# Patient Record
Sex: MALE | HISPANIC OR LATINO | Employment: FULL TIME | URBAN - NONMETROPOLITAN AREA
[De-identification: names, ages, dates, MRNs, and addresses within clinical notes are randomized per-mention and may not be internally consistent; named-entity substitution may affect disease eponyms.]

---

## 2022-02-10 ENCOUNTER — NON-PROVIDER VISIT (OUTPATIENT)
Dept: URGENT CARE | Facility: PHYSICIAN GROUP | Age: 57
End: 2022-02-10

## 2022-02-10 DIAGNOSIS — Z02.1 PRE-EMPLOYMENT DRUG SCREENING: ICD-10-CM

## 2022-02-10 LAB
AMP AMPHETAMINE: NORMAL
COC COCAINE: NORMAL
INT CON NEG: NORMAL
INT CON POS: NORMAL
MET METHAMPHETAMINES: NORMAL
OPI OPIATES: NORMAL
PCP PHENCYCLIDINE: NORMAL
POC DRUG COMMENT 753798-OCCUPATIONAL HEALTH: NEGATIVE
THC: NORMAL

## 2022-02-10 PROCEDURE — 80305 DRUG TEST PRSMV DIR OPT OBS: CPT | Performed by: FAMILY MEDICINE

## 2022-11-07 ENCOUNTER — OCCUPATIONAL MEDICINE (OUTPATIENT)
Dept: URGENT CARE | Facility: PHYSICIAN GROUP | Age: 57
End: 2022-11-07
Payer: OTHER MISCELLANEOUS

## 2022-11-07 ENCOUNTER — APPOINTMENT (OUTPATIENT)
Dept: RADIOLOGY | Facility: IMAGING CENTER | Age: 57
End: 2022-11-07
Attending: STUDENT IN AN ORGANIZED HEALTH CARE EDUCATION/TRAINING PROGRAM
Payer: OTHER MISCELLANEOUS

## 2022-11-07 ENCOUNTER — NON-PROVIDER VISIT (OUTPATIENT)
Dept: URGENT CARE | Facility: PHYSICIAN GROUP | Age: 57
End: 2022-11-07
Payer: OTHER MISCELLANEOUS

## 2022-11-07 VITALS
DIASTOLIC BLOOD PRESSURE: 74 MMHG | HEIGHT: 66 IN | WEIGHT: 196 LBS | TEMPERATURE: 97.3 F | BODY MASS INDEX: 31.5 KG/M2 | RESPIRATION RATE: 16 BRPM | OXYGEN SATURATION: 98 % | HEART RATE: 60 BPM | SYSTOLIC BLOOD PRESSURE: 136 MMHG

## 2022-11-07 DIAGNOSIS — S80.12XA CONTUSION OF LEFT LOWER EXTREMITY, INITIAL ENCOUNTER: ICD-10-CM
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
## 2022-11-07 DIAGNOSIS — Z02.1 PRE-EMPLOYMENT DRUG SCREENING: ICD-10-CM
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
## 2022-11-07 LAB
AMP AMPHETAMINE: NORMAL
BREATH ALCOHOL COMMENT: NORMAL
COC COCAINE: NORMAL
INT CON NEG: NORMAL
INT CON POS: NORMAL
MET METHAMPHETAMINES: NORMAL
OPI OPIATES: NORMAL
PCP PHENCYCLIDINE: NORMAL
POC BREATHALIZER: 0 PERCENT (ref 0–0.01)
POC DRUG COMMENT 753798-OCCUPATIONAL HEALTH: NEGATIVE
THC: NORMAL

## 2022-11-07 PROCEDURE — 99203 OFFICE O/P NEW LOW 30 MIN: CPT | Performed by: STUDENT IN AN ORGANIZED HEALTH CARE EDUCATION/TRAINING PROGRAM

## 2022-11-07 PROCEDURE — 80305 DRUG TEST PRSMV DIR OPT OBS: CPT | Performed by: STUDENT IN AN ORGANIZED HEALTH CARE EDUCATION/TRAINING PROGRAM

## 2022-11-07 PROCEDURE — 82075 ASSAY OF BREATH ETHANOL: CPT | Performed by: STUDENT IN AN ORGANIZED HEALTH CARE EDUCATION/TRAINING PROGRAM

## 2022-11-07 PROCEDURE — 73590 X-RAY EXAM OF LOWER LEG: CPT | Mod: TC,FY,LT | Performed by: RADIOLOGY

## 2022-11-07 NOTE — LETTER
[facility] [address] [place] [address]  Phone:  [phone] - Fax:  [phone]   [facility] Progress Report and Disability Certification  Date of Service: 11/7/2022   No Show:  No  Date / Time of Next Visit: 11/14/2022   Claim Information   Patient Name: Javier Cruz  Claim Number:     Employer: [employer] Date of Injury: 11/4/2022     Insurer / TPA: Misc Workers Comp ID / SSN:     Occupation:  1 Diagnosis: Diagnoses of Contusion of left lower extremity, initial encounter and Work related injury were pertinent to this visit.    Medical Information   Related to Industrial Injury? Yes   Subjective Complaints:  Patient does not speak English.  An  was used to aid with the encounter.    Today's date: 11/7/2022.  Visit #1.  Date of injury: 11/4/2022.  CC: Left shin pain  Mechanism of injury: Patient was carrying a heavy basket and unintentionally hit his left leg with 2 metal pieces on the basket suffering a skin abrasion.  He then developed swelling and pain.  Now he is having persistent discomfort at the site of impact that is aggravated with palpation and weightbearing.  He has tried rest and Tylenol which have not helped.  No additional modifying factors.  No history of additional trauma, injury or surgery to his left lower extremity.   Objective Findings: Gen: no acute distress, normal voice  Skin: dry, intact, moist mucosal membranes  Head: Atraumatic, normocephalic  Psych: normal affect, normal judgement, alert, awake  Musculoskeletal: Left lower extremity, middle 1/3 anteriorly: half dollar size skin abrasion with surrounding edema, minimal ecchymosis.  Localized tenderness to palpation within the region.      RADIOLOGY RESULTS  DX-TIBIA AND FIBULA LEFT    Result Date: 11/7/2022 11/7/2022 4:19 PM HISTORY/REASON FOR EXAM:  Pain/Deformity Following Trauma. TECHNIQUE/EXAM DESCRIPTION AND NUMBER OF VIEWS:  2  views of the LEFT tibia and fibula. COMPARISON: None FINDINGS: Anterior proximal leg soft tissue swelling without foreign body No acute fracture or subluxation is seen. No periosteal reaction or bony destruction Moderate Achilles enthesophyte formation     No radiographic evidence of acute bony injury. Anterior soft tissue swelling compatible with contusion               Pre-Existing Condition(s):     Assessment:   Initial Visit    Status: Additional Care Required  Permanent Disability:No    Plan:      Diagnostics:      Comments:       Disability Information   Status: Released to Restricted Duty    From:  11/7/2022  Through: 11/14/2022 Restrictions are: Temporary   Physical Restrictions   Sitting:    Standing:  < or = to 4 hrs/day Stooping:    Bending:      Squatting:    Walking:  < or = to 4 hrs/day Climbing:    Pushing:      Pulling:    Other:    Reaching Above Shoulder (L):   Reaching Above Shoulder (R):       Reaching Below Shoulder (L):    Reaching Below Shoulder (R):      Not to exceed Weight Limits   Carrying(hrs):   Weight Limit(lb): < or = to 25 pounds Lifting(hrs):   Weight  Limit(lb): < or = to 25 pounds   Comments: X-rays negative for any acute osseous abnormalities.  - Restrictions per D 39  - Continue Tylenol as needed for symptomatic relief  - Also encouraged ice and elevation  - Follow-up in 1 week for reevaluation    Repetitive Actions   Hands: i.e. Fine Manipulations from Grasping:     Feet: i.e. Operating Foot Controls:     Driving / Operate Machinery:     Health Care Provider’s Original or Electronic Signature  Hamlet Can D.O. Health Care Provider’s Original or Electronic Signature    Munir Stein MD         Clinic Name / Location: [address] [address] Clinic Phone Number: Dept: [phone]   Appointment Time: 12:40 Pm Visit Start Time: 3:55 PM   Check-In Time:  1:21 Pm Visit Discharge Time: 4:30 PM   Original-Treating Physician or  Chiropractor    Page 2-Insurer/TPA    Page 3-Employer    Page 4-Employee

## 2022-11-07 NOTE — LETTER
"EMPLOYEE’S CLAIM FOR COMPENSATION/ REPORT OF INITIAL TREATMENT  FORM C-4    EMPLOYEE’S CLAIM - PROVIDE ALL INFORMATION REQUESTED   First Name  Javier Last Name  Sinan Cruz Birthdate                    1965                Sex  male Claim Number (Insurer’s Use Only)    Home Address  [address] Age  57 y.o. 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 (Be specific an answer in detail. Use additional sheet if necessary)  After stacking product , he hit his leg on the product, causing a small cut and bruise   If you believe that you have an occupational disease, when did you first have knowledge of the disability and it relationship to your employment?  n/a Witnesses to the Accident  none      Nature of Injury or Occupational Disease  Workers' Compensation  Part(s) of Body Injured or Affected  Lower Leg (L), N/A, N/A    I certify that the above is true and correct to the best of my knowledge and that I " have provided this information in order to obtain the benefits of [place]  I hereby authorize any physician, chiropractor, surgeon, practitioner, or other person, any hospital, including [facility] or [facility] hospital, any medical service organization, any insurance company, or other institution or organization to release to each other, any medical or other information, including benefits paid or payable, pertinent to this injury or disease, except information relative to diagnosis, treatment and/or counseling for AIDS, psychological conditions, alcohol or controlled substances, for which I must give specific authorization.  A Photostat of this authorization shall be as valid as the original.     Date 11/07/2022   Place [facility] Employee’s Original or  *Electronic Signature   THIS REPORT MUST BE COMPLETED AND MAILED WITHIN 3 WORKING DAYS OF TREATMENT   Place  [facility]  Name of Facility  [facility]   Date  11/7/2022 Diagnosis and Description of Injury or Occupational Disease  (S80.12XA) Contusion of left lower extremity, initial encounter  (Y99.0) Work related injury Is there evidence the injured employee was under the influence of alcohol and/or another controlled substance at the time of accident?  ? No ? Yes (if yes, please explain)    Hour  3:55 PM   Diagnoses of Contusion of left lower extremity, initial encounter and Work related injury were pertinent to this visit. No   Treatment  X-rays negative for any acute osseous abnormalities.  - Restrictions per D 39  - Continue Tylenol as needed for symptomatic relief  - Also encouraged ice and elevation  - Follow-up in 1 week for reevaluation  Have you advised the patient to remain off work five days or     more?    X-Ray Findings  Negative   ? Yes Indicate dates:   From   To      From information given by the employee,  "together with medical evidence, can        you directly connect this injury or occupational disease as job incurred?  Yes ? No If no, is the injured employee capable of:  ? full duty  No ? modified duty  Yes   Is additional medical care by a physician indicated?  Yes If Modified Duty, Specify any Limitations / Restrictions  See D39   Do you know of any previous injury or disease contributing to this condition or occupational disease?  ? Yes ? No (Explain if yes)                          No   Date  11/7/2022 Print Health Care Provider's   Hamlet Can D.O. I certify the employer’s copy of  this form was mailed on:   Address  [address] Insurer’s Use Only     [facility] Zip  [address]    Provider’s Tax ID Number  [id number] Telephone  Dept: [phone]             Health Care Provider’s Original or Electronic Signature  e-HAMLET Puentes D.O. Degree (MD,DO, DC,PA-C,APRN)   DO      * Complete and attach Release of Information (Form C-4A) when injured employee signs C-4 Form electronically  ORIGINAL - TREATING HEALTHCARE PROVIDER PAGE 2 - INSURER/TPA PAGE 3 - EMPLOYER PAGE 4 - EMPLOYEE             Form C-4 (rev.08/21)           BRIEF DESCRIPTION OF RIGHTS AND BENEFITS  (Pursuant to [place] 616C.050)    Notice of Injury or Occupational Disease (Incident Report Form C-1): If an injury or occupational disease (OD) arises out of and in the course of employment, you must provide written notice to your employer as soon as practicable, but no later than 7 days after the accident or OD. Your employer shall maintain a sufficient supply of the required forms.    Claim for Compensation (Form C-4): If medical treatment is sought, the form C-4 is available at the place of initial treatment. A completed \"Claim for Compensation\" (Form C-4) must be filed within 90 days after an accident or OD. The treating physician or chiropractor must, within 3 working days after treatment, complete and mail to the " employer, the employer's insurer and third-party , the Claim for Compensation.    Medical Treatment: If you require medical treatment for your on-the-job injury or OD, you may be required to select a physician or chiropractor from a list provided by your workers’ compensation insurer, if it has contracted with an Organization for Managed Care (MCO) or Preferred Provider Organization (PPO) or providers of health care. If your employer has not entered into a contract with an MCO or PPO, you may select a physician or chiropractor from the Panel of Physicians and Chiropractors. Any medical costs related to your industrial injury or OD will be paid by your insurer.    Temporary Total Disability (TTD): If your doctor has certified that you are unable to work for a period of at least 5 consecutive days, or 5 cumulative days in a 20-day period, or places restrictions on you that your employer does not accommodate, you may be entitled to TTD compensation.    Temporary Partial Disability (TPD): If the wage you receive upon reemployment is less than the compensation for TTD to which you are entitled, the insurer may be required to pay you TPD compensation to make up the difference. TPD can only be paid for a maximum of 24 months.    Permanent Partial Disability (PPD): When your medical condition is stable and there is an indication of a PPD as a result of your injury or OD, within 30 days, your insurer must arrange for an evaluation by a rating physician or chiropractor to determine the degree of your PPD. The amount of your PPD award depends on the date of injury, the results of the PPD evaluation, your age and wage.    Permanent Total Disability (PTD): If you are medically certified by a treating physician or chiropractor as permanently and totally disabled and have been granted a PTD status by your insurer, you are entitled to receive monthly benefits not to exceed 66 2/3% of your average monthly wage. The  amount of your PTD payments is subject to reduction if you previously received a lump-sum PPD award.    Vocational Rehabilitation Services: You may be eligible for vocational rehabilitation services if you are unable to return to the job due to a permanent physical impairment or permanent restrictions as a result of your injury or occupational disease.    Transportation and Per Reagan Reimbursement: You may be eligible for travel expenses and per reagan associated with medical treatment.    Reopening: You may be able to reopen your claim if your condition worsens after claim closure.     Appeal Process: If you disagree with a written determination issued by the insurer or the insurer does not respond to your request, you may appeal to the Department of Administration, , by following the instructions contained in your determination letter. You must appeal the determination within 70 days from the date of the determination letter at [address], [address], [place], [place] [address], or [address], [address], [place] [address]. If you disagree with the  decision, you may appeal to the Department of Administration, . You must file your appeal within 30 days from the date of the  decision letter at [address], [address], [place], [place] [address], or [address], [address], [place] [address]. If you disagree with a decision of an , you may file a petition for judicial review with the District Court. You must do so within 30 days of the Appeal Officer’s decision. You may be represented by an  at your own expense or you may contact the [facility] for possible representation.    [place]  for Injured Workers (NAIW): If you disagree with a  decision, you may request that NAIW represent you without charge at an  Hearing. For information regarding denial of benefits,  you may contact the [facility] at: [address], [address], [place] [address], [phone]) [phone], or [address] [facility], [address], [place] [address], [phone]    To File a Complaint with the Division: If you wish to file a complaint with the  of the Division of Industrial Relations (DIR),  please contact the Workers’ Compensation Section, [address], [address], [place], [place] [address], telephone (218) 210-7592, or [address], [address], [place], [place] [address], telephone [phone]) [phone].    For assistance with Workers’ Compensation Issues: You may contact the [facility] Office for Consumer Health Assistance, [address] [facility] [address], [place], [place] [address], Toll Free 1-776.116.2715, Web site: http://[facility].nv.gov/Programs/JENNY E-mail: [email or web address].[email or web address].[facility]              __________________________________________________________________                                    ___11/07/2022______            Employee Name / Signature                                                                                                                            Date                                                                                                                                                                                                                              D-2 (rev. 10/20)

## 2022-11-08 NOTE — PROGRESS NOTES
"Subjective:     Javier Cruz is a 57 y.o. male who presents for Work-Related Injury (DOI: 11/04/2022, (L) side leg, work comp new.  )      Patient does not speak English.  An  was used to aid with the encounter.    Today's date: 11/7/2022.  Visit #1.  Date of injury: 11/4/2022.  CC: Left shin pain  Mechanism of injury: Patient was carrying a heavy basket and unintentionally hit his left leg with 2 metal pieces on the basket suffering a skin abrasion.  He then developed swelling and pain.  Now he is having persistent discomfort at the site of impact that is aggravated with palpation and weightbearing.  He has tried rest and Tylenol which have not helped.  No additional modifying factors.  No history of additional trauma, injury or surgery to his left lower extremity.    PMH:   No pertinent past medical history to this problem  MEDS:  Medications were reviewed in EMR  ALLERGIES:  Allergies were reviewed in EMR  FH:   No pertinent family history to this problem       Objective:     /74   Pulse 60   Temp 36.3 °C (97.3 °F) (Temporal)   Resp 16   Ht 1.676 m (5' 6\")   Wt 88.9 kg (196 lb)   SpO2 98%   BMI 31.64 kg/m²     Gen: no acute distress, normal voice  Skin: dry, intact, moist mucosal membranes  Head: Atraumatic, normocephalic  Psych: normal affect, normal judgement, alert, awake  Musculoskeletal: Left lower extremity, middle 1/3 anteriorly: half dollar size skin abrasion with surrounding edema, minimal ecchymosis.  Localized tenderness to palpation within the region.      RADIOLOGY RESULTS  DX-TIBIA AND FIBULA LEFT    Result Date: 11/7/2022 11/7/2022 4:19 PM HISTORY/REASON FOR EXAM:  Pain/Deformity Following Trauma. TECHNIQUE/EXAM DESCRIPTION AND NUMBER OF VIEWS:  2 views of the LEFT tibia and fibula. COMPARISON: None FINDINGS: Anterior proximal leg soft tissue swelling without foreign body No acute fracture or subluxation is seen. No periosteal reaction or bony destruction Moderate " Achilles enthesophyte formation     No radiographic evidence of acute bony injury. Anterior soft tissue swelling compatible with contusion                Assessment/Plan:       1. Contusion of left lower extremity, initial encounter  - DX-TIBIA AND FIBULA LEFT; Future    2. Work related injury    Released to Restricted Duty FROM 11/7/2022 TO 11/14/2022  X-rays negative for any acute osseous abnormalities.  - Restrictions per D 39  - Continue Tylenol as needed for symptomatic relief  - Also encouraged ice and elevation  - Follow-up in 1 week for reevaluation       Differential diagnosis, natural history, supportive care, and indications for immediate follow-up discussed.

## 2022-11-14 ENCOUNTER — OCCUPATIONAL MEDICINE (OUTPATIENT)
Dept: URGENT CARE | Facility: PHYSICIAN GROUP | Age: 57
End: 2022-11-14
Payer: OTHER MISCELLANEOUS

## 2022-11-14 VITALS
BODY MASS INDEX: 32.99 KG/M2 | OXYGEN SATURATION: 97 % | RESPIRATION RATE: 16 BRPM | DIASTOLIC BLOOD PRESSURE: 60 MMHG | WEIGHT: 198 LBS | HEIGHT: 65 IN | SYSTOLIC BLOOD PRESSURE: 108 MMHG | TEMPERATURE: 98.6 F | HEART RATE: 65 BPM

## 2022-11-14 DIAGNOSIS — S80.12XD CONTUSION OF LEFT LOWER EXTREMITY, SUBSEQUENT ENCOUNTER: ICD-10-CM
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
## 2022-11-14 PROCEDURE — 99213 OFFICE O/P EST LOW 20 MIN: CPT | Performed by: FAMILY MEDICINE

## 2022-11-14 NOTE — LETTER
[facility] [place] [place] [address]  Phone:  [phone] - Fax:  [phone]   [facility] Progress Report and Disability Certification  Date of Service: 11/14/2022   No Show:  No  Date / Time of Next Visit: 11/21/2022   Claim Information   Patient Name: Javier Cruz  Claim Number:     Employer: [employer]  Date of Injury: 11/4/2022     Insurer / TPA: Misc Workers Comp  ID / SSN:     Occupation:  1  Diagnosis: Diagnoses of Work related injury and Contusion of left lower extremity, subsequent encounter were pertinent to this visit.    Medical Information   Related to Industrial Injury? Yes    Subjective Complaints:  DOI: 11/4/2022  ANITHA: Patient was carrying a heavy basket and unintentionally hit his left leg with 2 metal pieces on the basket.  He then had left shin pain.  Denies prior left leg injury.  No secondary employment.    Visit #2 today: He is feeling better today, estimates he is 100% of his baseline. He has been back to work and is doing well. He is no longer needing any Tylenol or Ibuprofen.    Objective Findings: Superficial abrasion noted to the anterior, middle part of his left shin, this is scabbed over and healing appropriately.  Mild surrounding edema, no erythema or signs of infection.  Not tender to palpation.   Pre-Existing Condition(s):     Assessment:   Condition Improved    Status: Additional Care Required  Permanent Disability:No    Plan:      Diagnostics:      Comments:  -We will release patient to full duty.  Plan for MMI if all goes well for next visit  -Ice, heat, Tylenol, and ibuprofen as needed for symptomatic relief    Disability Information   Status: Released to Full Duty    From:  11/14/2022  Through: 11/21/2022 Restrictions are: Temporary   Physical Restrictions   Sitting:    Standing:    Stooping:    Bending:      Squatting:    Walking:    Climbing:    Pushing:      Pulling:    Other:     Reaching Above Shoulder (L):   Reaching Above Shoulder (R):       Reaching Below Shoulder (L):    Reaching Below Shoulder (R):      Not to exceed Weight Limits   Carrying(hrs):   Weight Limit(lb):   Lifting(hrs):   Weight  Limit(lb):     Comments:      Repetitive Actions   Hands: i.e. Fine Manipulations from Grasping:     Feet: i.e. Operating Foot Controls:     Driving / Operate Machinery:     Health Care Provider’s Original or Electronic Signature  Olivia Marc M.D. Health Care Provider’s Original or Electronic Signature    Facundo Novoa DO MPH     Clinic Name / Location: [address] [address] Clinic Phone Number: Dept: [phone]   Appointment Time: 9:30 Am Visit Start Time: 9:58 AM   Check-In Time:  9:23 Am Visit Discharge Time: 10:00 AM   Original-Treating Physician or Chiropractor    Page 2-Insurer/TPA    Page 3-Employer    Page 4-Employee

## 2022-11-14 NOTE — PROGRESS NOTES
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 Feeling much better. Would like to be released back to full duty. / )      DOI: 11/4/2022  ANITHA: Patient was carrying a heavy basket and unintentionally hit his left leg with 2 metal pieces on the basket.  He then had left shin pain.  Denies prior left leg injury.  No secondary employment.    Visit #2 today: He is feeling better today, estimates he is 100% of his baseline. He has been back to work and is doing well. He is no longer needing any Tylenol or Ibuprofen.     PMH:   No pertinent past medical history to this problem  MEDS:  Medications were reviewed in EMR  ALLERGIES:  Allergies were reviewed in EMR  SOCHX:  Works as a weldor  FH:   No pertinent family history to this problem     Objective:     /60   Pulse 65   Temp 37 °C (98.6 °F) (Temporal)   Resp 16   Ht 1.651 m (5' 5\")   Wt 89.8 kg (198 lb)   SpO2 97%   BMI 32.95 kg/m²     Superficial abrasion noted to the anterior, middle part of his left shin, this is scabbed over and healing appropriately.  Mild surrounding edema, no erythema or signs of infection.  Not tender to palpation.    Assessment/Plan:     1. Work related injury    2. Contusion of left lower extremity, subsequent encounter  Released to Full Duty FROM 11/14/2022 TO 11/21/2022     -We will release patient to full duty.  Plan for MMI if all goes well for next visit  -Ice, heat, Tylenol, and ibuprofen as needed for symptomatic relief    Differential diagnosis, natural history, supportive care, and indications for immediate follow-up discussed.  "

## 2022-11-21 ENCOUNTER — OCCUPATIONAL MEDICINE (OUTPATIENT)
Dept: URGENT CARE | Facility: PHYSICIAN GROUP | Age: 57
End: 2022-11-21
Payer: OTHER MISCELLANEOUS

## 2022-11-21 VITALS
DIASTOLIC BLOOD PRESSURE: 74 MMHG | HEART RATE: 98 BPM | BODY MASS INDEX: 32.99 KG/M2 | SYSTOLIC BLOOD PRESSURE: 116 MMHG | WEIGHT: 198 LBS | TEMPERATURE: 96.8 F | RESPIRATION RATE: 16 BRPM | HEIGHT: 65 IN | OXYGEN SATURATION: 100 %

## 2022-11-21 DIAGNOSIS — S80.12XD CONTUSION OF LEFT LOWER EXTREMITY, SUBSEQUENT ENCOUNTER: ICD-10-CM

## 2022-11-21 PROCEDURE — 99213 OFFICE O/P EST LOW 20 MIN: CPT | Performed by: FAMILY MEDICINE

## 2022-11-21 NOTE — LETTER
[address] [place] [address]  Phone:  [phone] - Fax:  [phone]   [facility] Progress Report and Disability Certification  Date of Service: 11/21/2022   No Show:  No  Date / Time of Next Visit:     Claim Information   Patient Name: Javier Cruz  Claim Number:     Employer: [employer]  Date of Injury: 11/4/2022     Insurer / TPA: Misc Workers Comp  ID / SSN:     Occupation:  1  Diagnosis: The encounter diagnosis was Contusion of left lower extremity, subsequent encounter.    Medical Information   Related to Industrial Injury? Yes    Subjective Complaints:    DOI:   11/4      Status post left leg contusion.       States pain is resolved.      Objective Findings: Musculoskeletal -  Left  leg - no TTP.  No bruising or swelling.  No lower extremity edema noted.  Benita's sign negative       Pre-Existing Condition(s):     Assessment:   Condition Improved    Status: Discharged /  MMI  Permanent Disability:No    Plan:      Diagnostics:      Comments:       Disability Information   Status: Released to Full Duty    From:     Through:   Restrictions are:     Physical Restrictions   Sitting:    Standing:    Stooping:    Bending:      Squatting:    Walking:    Climbing:    Pushing:      Pulling:    Other:    Reaching Above Shoulder (L):   Reaching Above Shoulder (R):       Reaching Below Shoulder (L):    Reaching Below Shoulder (R):      Not to exceed Weight Limits   Carrying(hrs):   Weight Limit(lb):   Lifting(hrs):   Weight  Limit(lb):     Comments: Contusion of left lower extremity, subsequent encounter   Resolved  MMI  Full duty    Repetitive Actions   Hands: i.e. Fine Manipulations from Grasping:     Feet: i.e. Operating Foot Controls:     Driving / Operate Machinery:     Health Care Provider’s Original or Electronic Signature  Vito Donahue M.D. Health Care Provider’s Original or Electronic Signature    Facundo  DO Sommer MPH     Clinic Name / Location: [facility] [place]  [place] [address] Clinic Phone Number: Dept: [phone]   Appointment Time: 10:30 Am Visit Start Time: 10:56 AM   Check-In Time:  10:11 Am Visit Discharge Time: 11:09 AM   Original-Treating Physician or Chiropractor    Page 2-Insurer/TPA    Page 3-Employer    Page 4-Employee

## 2022-11-21 NOTE — PROGRESS NOTES
"  HPI:       DOI:   11/4      Status post left leg contusion.       States pain is resolved.              No past medical history on file.    Social History     Tobacco Use    Smoking status: Never   Substance Use Topics    Alcohol use: No    Drug use: No         No family history on file.            Review of Systems   Constitutional: Negative for fever, chills and malaise/fatigue.   Eyes: Negative for vision changes, d/c.    Respiratory: Negative for cough and sputum production.    Cardiovascular: Negative for chest pain and palpitations.   Gastrointestinal: Negative for nausea, vomiting, abdominal pain, diarrhea and constipation.   Genitourinary: Negative for dysuria, urgency and frequency.   Skin: Negative for rash or  itching.   Neurological: Negative for dizziness and tingling.   Psychiatric/Behavioral: Negative for depression.   Hematologic/lymphatic - denies bruising or excessive bleeding  All other systems reviewed and are negative.      OBJECTIVE  /74   Pulse 98   Temp 36 °C (96.8 °F) (Temporal)   Resp 16   Ht 1.651 m (5' 5\")   Wt 89.8 kg (198 lb)   SpO2 100%     HEENT - PERRLA, EOMI  Neuro - alert and oriented x3. CN 2-12 grossly intact.  Lungs - CTA. No wheezes, rhonchi or rales.  Heart - regular rate and rhythm without murmur.  Abdomen - soft and non-tender, bowel sounds active x4.  Musculoskeletal -  left leg - no TTP.  No bruising or swelling.  + healing scab noted on anterior shin.  No lower extremity edema noted.  Benita's sign negative          A/P:     1. Contusion of left lower extremity, subsequent encounter   Resolved  MMI  Full duty    "

## 2022-12-19 ENCOUNTER — NON-PROVIDER VISIT (OUTPATIENT)
Dept: URGENT CARE | Facility: PHYSICIAN GROUP | Age: 57
End: 2022-12-19

## 2022-12-19 ENCOUNTER — HOSPITAL ENCOUNTER (OUTPATIENT)
Facility: MEDICAL CENTER | Age: 57
End: 2022-12-19
Attending: PHYSICIAN ASSISTANT

## 2022-12-19 ENCOUNTER — OCCUPATIONAL MEDICINE (OUTPATIENT)
Dept: URGENT CARE | Facility: PHYSICIAN GROUP | Age: 57
End: 2022-12-19
Payer: OTHER MISCELLANEOUS

## 2022-12-19 ENCOUNTER — APPOINTMENT (OUTPATIENT)
Dept: RADIOLOGY | Facility: IMAGING CENTER | Age: 57
End: 2022-12-19
Attending: PHYSICIAN ASSISTANT
Payer: OTHER MISCELLANEOUS

## 2022-12-19 VITALS
BODY MASS INDEX: 32.65 KG/M2 | HEIGHT: 65 IN | OXYGEN SATURATION: 95 % | TEMPERATURE: 97.7 F | RESPIRATION RATE: 16 BRPM | SYSTOLIC BLOOD PRESSURE: 126 MMHG | DIASTOLIC BLOOD PRESSURE: 94 MMHG | WEIGHT: 196 LBS | HEART RATE: 65 BPM

## 2022-12-19 DIAGNOSIS — L03.012 CELLULITIS OF THUMB, LEFT: ICD-10-CM

## 2022-12-19 DIAGNOSIS — S61.032A PUNCTURE WOUND OF LEFT THUMB, INITIAL ENCOUNTER: ICD-10-CM

## 2022-12-19 DIAGNOSIS — Z02.83 ENCOUNTER FOR DRUG SCREENING: ICD-10-CM

## 2022-12-19 PROCEDURE — 87070 CULTURE OTHR SPECIMN AEROBIC: CPT

## 2022-12-19 PROCEDURE — 99204 OFFICE O/P NEW MOD 45 MIN: CPT | Mod: 25 | Performed by: PHYSICIAN ASSISTANT

## 2022-12-19 PROCEDURE — 90471 IMMUNIZATION ADMIN: CPT | Performed by: PHYSICIAN ASSISTANT

## 2022-12-19 PROCEDURE — 87205 SMEAR GRAM STAIN: CPT

## 2022-12-19 PROCEDURE — 73140 X-RAY EXAM OF FINGER(S): CPT | Mod: TC,FY,LT | Performed by: RADIOLOGY

## 2022-12-19 PROCEDURE — 90715 TDAP VACCINE 7 YRS/> IM: CPT | Performed by: PHYSICIAN ASSISTANT

## 2022-12-19 RX ORDER — DOXYCYCLINE HYCLATE 100 MG
100 TABLET ORAL 2 TIMES DAILY
Qty: 14 TABLET | Refills: 0 | Status: SHIPPED | OUTPATIENT
Start: 2022-12-19 | End: 2022-12-26

## 2022-12-19 NOTE — LETTER
[facility] [place] [place] [address]  Phone:  [phone] - Fax:  [phone]   [facility] Progress Report and Disability Certification  Date of Service: 12/19/2022   No Show:  No  Date / Time of Next Visit: 12/21/2022 300 PM   Claim Information   Patient Name: Javier Cruz  Claim Number:     Employer: [employer]  Date of Injury: 12/16/2022     Insurer / TPA: Misc Workers Comp  ID / SSN:     Occupation:  Diagnosis: Diagnoses of Puncture wound of left thumb, initial encounter and Cellulitis of thumb, left were pertinent to this visit.    Medical Information   Related to Industrial Injury? Yes    Subjective Complaints:  DOI: 12/16/2022   Initial Visit   ANITHA: Patient states while he was welding a piece of wire, the wire accidentally went through his welding glove and punctured his left thumb. Gradually worsening redness, swelling, and pain. Small amount of pus drainage. He cannot flex his thumb secondary to the pain and swelling. No fever or chills. He states his last tetanus vaccine was a long time ago. No prior contributing injuries. Denies a second job.    Objective Findings: Constitutional: Well-appearing in no acute distress  Musculoskeletal: Left hand-significant erythema and swelling over the IP joint of the dorsal side of the left thumb.  Scant yellow dried fluid.  No fluctuance or abscess.  Unable to actively and passively flex thumb secondary to pain and swelling.  Negative crepitus.  Erythema does not extend beyond MCP joint.  No streaking.  Sensation intact distally.  Cap refill less than 2 seconds.   Pre-Existing Condition(s):     Assessment:   Initial Visit    Status: Additional Care Required  Permanent Disability:No    Plan:      Diagnostics: X-ray    Comments:  Plan:   - rocephin IM x 1   - doxycycline antibiotic   - wound culture pending   - wound care as discussed. Area cleaned today in clinic and wrapped with  nonstick dressing. Keep area clean with soap and water. Epsom salt soaks.   - Ibuprofen for pain   - work restrictions per D39   - Tetanus updated today   - Return here in 2 days for recheck     Disability Information   Status: Released to Restricted Duty    From:  12/19/2022  Through: 12/21/2022 Restrictions are: Temporary   Physical Restrictions   Sitting:    Standing:    Stooping:    Bending:      Squatting:    Walking:    Climbing:    Pushing:      Pulling:    Other:    Reaching Above Shoulder (L):   Reaching Above Shoulder (R):       Reaching Below Shoulder (L):    Reaching Below Shoulder (R):      Not to exceed Weight Limits   Carrying(hrs):   Weight Limit(lb):   Lifting(hrs):   Weight  Limit(lb):     Comments: No lifting, gripping, pulling, pushing more than 10 pounds with left hand only.    Repetitive Actions   Hands: i.e. Fine Manipulations from Grasping:     Feet: i.e. Operating Foot Controls:     Driving / Operate Machinery:     Health Care Provider’s Original or Electronic Signature  Kvng Bashir P.A.-C. Health Care Provider’s Original or Electronic Signature    Facundo Novoa DO MPH     Clinic Name / Location: [address] [address] Clinic Phone Number: Dept: [phone]   Appointment Time: 12:45 Pm Visit Start Time: 1:04 PM   Check-In Time:  12:49 Pm Visit Discharge Time: 2:28 PM   Original-Treating Physician or Chiropractor    Page 2-Insurer/TPA    Page 3-Employer    Page 4-Employee

## 2022-12-19 NOTE — PROGRESS NOTES
"Subjective:     Javier Cruz is a 57 y.o. male who presents for Work-Related Injury (Pt is here for WC injury, L thumb, welding accident, hot wire went thru the glove 12/16 DOI)      DOI: 12/16/2022   Initial Visit   ANITHA: Patient states while he was welding a piece of wire, the wire accidentally went through his welding glove and punctured his left thumb. Gradually worsening redness, swelling, and pain. Small amount of pus drainage. He cannot flex his thumb secondary to the pain and swelling. No fever or chills. He states his last tetanus vaccine was a long time ago. No prior contributing injuries. Denies a second job.     PMH:   No pertinent past medical history to this problem  MEDS:  Medications were reviewed in EMR  ALLERGIES:  Allergies were reviewed in EMR  SOCHX:  Works as a    FH:   No pertinent family history to this problem       Objective:     BP (!) 126/94   Pulse 65   Temp 36.5 °C (97.7 °F) (Temporal)   Resp 16   Ht 1.651 m (5' 5\")   Wt 88.9 kg (196 lb)   SpO2 95%   BMI 32.62 kg/m²     Constitutional: Well-appearing in no acute distress  Musculoskeletal: Left hand-significant erythema and swelling over the IP joint of the dorsal side of the left thumb.  Scant yellow dried fluid.  No fluctuance or abscess.  Unable to actively and passively flex thumb secondary to pain and swelling.  Negative crepitus.  Erythema does not extend beyond MCP joint.  No streaking.  Sensation intact distally.  Cap refill less than 2 seconds.      The patient verbalized permission and consent to allow picture to be taken through [employer] Mobile Application to be placed in Chart for documentation. It was explained that this picture is not saved on any personal files or albums on my mobile device. Patient verbalized understanding, permission, and agreement.           RADIOLOGY RESULTS   DX-FINGER(S) 2+ LEFT    Result Date: 12/19/2022 12/19/2022 1:32 PM HISTORY/REASON FOR EXAM:  Pain in first digit of left " hand after puncture wound to first digit of left hand. TECHNIQUE/EXAM DESCRIPTION AND NUMBER OF VIEWS:   3 views of the LEFT fingers. COMPARISON: None FINDINGS: Bone mineralization is normal.  There is no evidence of fracture or dislocation.  There is joint space narrowing and marginal spurring at the IP joint of the first digit.  Soft tissue swelling is noted.  No metallic foreign bodies are identified.     1.  No evidence of fracture or dislocation.   2.  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           Assessment/Plan:       1. Puncture wound of left thumb, initial encounter  - Tdap =>8yo IM  - DX-FINGER(S) 2+ LEFT; Future    2. Cellulitis of thumb, left  - DX-FINGER(S) 2+ LEFT; Future  - cefTRIAXone (Rocephin) 1 g, lidocaine (XYLOCAINE) 1 % 3.6 mL for IM use  - doxycycline (VIBRAMYCIN) 100 MG Tab; Take 1 Tablet by mouth 2 times a day for 7 days.  Dispense: 14 Tablet; Refill: 0  - CULTURE WOUND W/ GRAM STAIN; Future    Released to Restricted Duty FROM 12/19/2022 TO 12/21/2022  No lifting, gripping, pulling, pushing more than 10 pounds with left hand only.  Plan:   - rocephin IM x 1   - doxycycline antibiotic   - wound culture pending   - wound care as discussed. Area cleaned today in clinic and wrapped with nonstick dressing. Keep area clean with soap and water. Epsom salt soaks.   - Ibuprofen for pain   - work restrictions per D39   - Tetanus updated today   - Return here in 2 days for recheck     X-ray results per radiologist interpretation above. I personally reviewed images and radiologist report.  Differential diagnosis, natural history, supportive care, and indications for immediate follow-up discussed.

## 2022-12-19 NOTE — LETTER
"EMPLOYEE’S CLAIM FOR COMPENSATION/ REPORT OF INITIAL TREATMENT  FORM C-4    EMPLOYEE’S CLAIM - PROVIDE ALL INFORMATION REQUESTED   First Name  Javier Last Name  Sinan Cruz Birthdate                    1965                Sex  male Claim Number (Insurer’s Use Only)   Home Address  [address] Age  57 y.o. Height  1.651 m (5' 5\") Weight  88.9 kg (196 lb) N     [facility] Zip  [address] Telephone  [phone] (home)    Mailing Address  [address] [place] [address] [address] [facility] Zip  [address] Primary Language Spoken  American    Insurer   Third-Party   Misc Workers Comp   Employee's Occupation (Job Title) When Injury or Occupational Disease Occurred      Employer's Name/Company Name  [employer]  Telephone      Office Mail Address (Number and Street)  [address]   [facility]  Zip  [address]    Date of Injury  12/16/2022               Hours Injury  8:30 PM Date Employer Notified  12/19/2022 Last Day of Work after Injury     or Occupational Disease  12/16/2022 Supervisor to Whom Injury     Reported  [facility]   Address or Location of Accident (if applicable)  Work [1]   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 (Be specific an answer in detail. Use additional sheet if necessary)  welding the baskets when the tip of the  penetrated the glove and went into the muscle at his left thumb   If you believe that you have an occupational disease, when did you first have knowledge of the disability and it relationship to your employment?  NA Witnesses to the Accident  My new welding melany, don't know his name      Nature of Injury or Occupational Disease  Workers' Compensation  Part(s) of Body Injured or Affected  Thumb (L), ,     I certify that the above is true and correct to the best of my " knowledge and that I have provided this information in order to obtain the benefits of [place]  I hereby authorize any physician, chiropractor, surgeon, practitioner, or other person, any hospital, including [facility] or [facility], any medical service organization, any insurance company, or other institution or organization to release to each other, any medical or other information, including benefits paid or payable, pertinent to this injury or disease, except information relative to diagnosis, treatment and/or counseling for AIDS, psychological conditions, alcohol or controlled substances, for which I must give specific authorization.  A Photostat of this authorization shall be as valid as the original.     Date 12/19/2022   Place [facility] Employee’s Original or  *Electronic Signature   THIS REPORT MUST BE COMPLETED AND MAILED WITHIN 3 WORKING DAYS OF TREATMENT   Place  [facility] [place]  Name of Facility  [place]   Date  12/19/2022 Diagnosis and Description of Injury or Occupational Disease  (S61.032A) Puncture wound of left thumb, initial encounter  (L03.012) Cellulitis of thumb, left Is there evidence the injured employee was under the influence of alcohol and/or another controlled substance at the time of accident?  ? No ? Yes (if yes, please explain)   Hour  1:04 PM   Diagnoses of Puncture wound of left thumb, initial encounter and Cellulitis of thumb, left were pertinent to this visit. No   Treatment  - rocephin IM x 1   - doxycycline antibiotic   - wound culture pending   - wound care as discussed. Area cleaned today in clinic and wrapped with nonstick dressing. Keep area clean with soap and water. Epsom salt soaks.   - Ibuprofen for pain   - work restrictions per D39   - Tetanus updated today   - Return here in 2 days for recheck   Have you advised the patient to  "remain off work five days or     more?    X-Ray Findings  Negative   ? Yes Indicate dates:   From   To      From information given by the employee, together with medical evidence, can        you directly connect this injury or occupational disease as job incurred?  Yes ? No If no, is the injured employee capable of:  ? full duty  No ? modified duty  Yes   Is additional medical care by a physician indicated?  Yes If Modified Duty, Specify any Limitations / Restrictions  Any duty following work restrictions per D39    Do you know of any previous injury or disease contributing to this condition or occupational disease?  ? Yes ? No (Explain if yes)                          No   Date  12/19/2022 Print Health Care Provider's   Kelly Bashir P.A.-C. I certify the employer’s copy of  this form was mailed on:   Address  [address] Insurer’s Use Only     [facility] Zip  [address]    Provider’s Tax ID Number  [id number] Telephone  Dept: [phone]             Health Care Provider’s Original or Electronic Signature  e-KELLY Jiang P.A.-C. Degree (MD,DO, DC,PAKarlaC,APRN)        * Complete and attach Release of Information (Form C-4A) when injured employee signs C-4 Form electronically  ORIGINAL - TREATING HEALTHCARE PROVIDER PAGE 2 - INSURER/TPA PAGE 3 - EMPLOYER PAGE 4 - EMPLOYEE             Form C-4 (rev.08/21)           BRIEF DESCRIPTION OF RIGHTS AND BENEFITS  (Pursuant to [place] 616C.050)    Notice of Injury or Occupational Disease (Incident Report Form C-1): If an injury or occupational disease (OD) arises out of and in the course of employment, you must provide written notice to your employer as soon as practicable, but no later than 7 days after the accident or OD. Your employer shall maintain a sufficient supply of the required forms.    Claim for Compensation (Form C-4): If medical treatment is sought, the form C-4 is available at the place of initial treatment. A completed \"Claim for " "Compensation\" (Form C-4) must be filed within 90 days after an accident or OD. The treating physician or chiropractor must, within 3 working days after treatment, complete and mail to the employer, the employer's insurer and third-party , the Claim for Compensation.    Medical Treatment: If you require medical treatment for your on-the-job injury or OD, you may be required to select a physician or chiropractor from a list provided by your workers’ compensation insurer, if it has contracted with an Organization for Managed Care (MCO) or Preferred Provider Organization (PPO) or providers of health care. If your employer has not entered into a contract with an MCO or PPO, you may select a physician or chiropractor from the Panel of Physicians and Chiropractors. Any medical costs related to your industrial injury or OD will be paid by your insurer.    Temporary Total Disability (TTD): If your doctor has certified that you are unable to work for a period of at least 5 consecutive days, or 5 cumulative days in a 20-day period, or places restrictions on you that your employer does not accommodate, you may be entitled to TTD compensation.    Temporary Partial Disability (TPD): If the wage you receive upon reemployment is less than the compensation for TTD to which you are entitled, the insurer may be required to pay you TPD compensation to make up the difference. TPD can only be paid for a maximum of 24 months.    Permanent Partial Disability (PPD): When your medical condition is stable and there is an indication of a PPD as a result of your injury or OD, within 30 days, your insurer must arrange for an evaluation by a rating physician or chiropractor to determine the degree of your PPD. The amount of your PPD award depends on the date of injury, the results of the PPD evaluation, your age and wage.    Permanent Total Disability (PTD): If you are medically certified by a treating physician or chiropractor as " permanently and totally disabled and have been granted a PTD status by your insurer, you are entitled to receive monthly benefits not to exceed 66 2/3% of your average monthly wage. The amount of your PTD payments is subject to reduction if you previously received a lump-sum PPD award.    Vocational Rehabilitation Services: You may be eligible for vocational rehabilitation services if you are unable to return to the job due to a permanent physical impairment or permanent restrictions as a result of your injury or occupational disease.    Transportation and Per Reagan Reimbursement: You may be eligible for travel expenses and per reagan associated with medical treatment.    Reopening: You may be able to reopen your claim if your condition worsens after claim closure.     Appeal Process: If you disagree with a written determination issued by the insurer or the insurer does not respond to your request, you may appeal to the Department of Administration, , by following the instructions contained in your determination letter. You must appeal the determination within 70 days from the date of the determination letter at [address], [address], [place] [address], or [address], [place] [address]. If you disagree with the  decision, you may appeal to the Department of Administration, . You must file your appeal within 30 days from the date of the  decision letter at [address], [address], [place], [place] [address], or [address] [facility] [address], [place] [address]. If you disagree with a decision of an , you may file a petition for judicial review with the District Court. You must do so within 30 days of the Appeal Officer’s decision. You may be represented by an  at your own expense or you may contact the [facility] for possible representation.    [place]  for Injured Workers  (NAIW): If you disagree with a  decision, you may request that NAIW represent you without charge at an  Hearing. For information regarding denial of benefits, you may contact the [facility] at: [address], [address], [place] [address], [phone], or [address], [address], [place] [address], [phone]    To File a Complaint with the Division: If you wish to file a complaint with the  of the Division of Industrial Relations (DIR),  please contact the Workers’ Compensation Section, [address], [address], [place], [place] [address], telephone [phone], or [address], [address], [place], [place] [address], telephone [phone].    For assistance with Workers’ Compensation Issues: You may contact the [facility] Office for Consumer Health Assistance, [address], [address], [place], [place] [address], Toll Free 1-430.951.4898, Web site: http://[facility].[place].gov/Programs/JENNY E-mail: [email or web address]              __________________________________________________________________                              12/19/2022            Employee Name / Signature                                                                                                                            Date                                                                                                                                                                                                                              D-2 (rev. 10/20)

## 2022-12-20 DIAGNOSIS — L03.012 CELLULITIS OF THUMB, LEFT: ICD-10-CM

## 2022-12-20 LAB
GRAM STN SPEC: NORMAL
SIGNIFICANT IND 70042: NORMAL
SITE SITE: NORMAL
SOURCE SOURCE: NORMAL

## 2022-12-21 ENCOUNTER — OCCUPATIONAL MEDICINE (OUTPATIENT)
Dept: URGENT CARE | Facility: PHYSICIAN GROUP | Age: 57
End: 2022-12-21
Payer: OTHER MISCELLANEOUS

## 2022-12-21 VITALS
SYSTOLIC BLOOD PRESSURE: 124 MMHG | OXYGEN SATURATION: 98 % | HEART RATE: 66 BPM | TEMPERATURE: 98.1 F | HEIGHT: 65 IN | BODY MASS INDEX: 32.59 KG/M2 | DIASTOLIC BLOOD PRESSURE: 70 MMHG | WEIGHT: 195.6 LBS | RESPIRATION RATE: 18 BRPM

## 2022-12-21 DIAGNOSIS — L03.012 CELLULITIS OF LEFT THUMB: ICD-10-CM

## 2022-12-21 DIAGNOSIS — S61.032D PUNCTURE WOUND OF LEFT THUMB, SUBSEQUENT ENCOUNTER: ICD-10-CM

## 2022-12-21 PROCEDURE — 99215 OFFICE O/P EST HI 40 MIN: CPT | Performed by: PHYSICIAN ASSISTANT

## 2022-12-21 ASSESSMENT — ENCOUNTER SYMPTOMS
CHILLS: 1
FEVER: 1
MYALGIAS: 1

## 2022-12-21 NOTE — PROGRESS NOTES
"Subjective     Javier Cruz is a 57 y.o. male who presents with Follow-Up (W/C, pt was in Monday and pain has not gotten better. Pt states it is still hard to move and use his hand. Left thumb)      HPI: From 12/19/22  DOI: 12/16/2022   Initial Visit   ANITHA: Patient states while he was welding a piece of wire, the wire accidentally went through his welding glove and punctured his left thumb. Gradually worsening redness, swelling, and pain. Small amount of pus drainage. He cannot flex his thumb secondary to the pain and swelling. No fever or chills. He states his last tetanus vaccine was a long time ago. No prior contributing injuries. Denies a second job.     HPI: Today 12/21/22  Very pleasant 57-year-old male presented to the clinic for follow-up regarding a recent work-related injury.  Patient states his pain has worsened since last visit.  Swelling and redness have also increased.  States he has occasional purulent drainage from the puncture wound.  Range of motion is severely limited at this time due to pain and swelling.  Last visit he received 1 g of Rocephin in clinic and was discharged with a course of doxycycline.  He has been taking his antibiotics as prescribed.  States he is beginning to feel slightly feverish and chilled.         Review of Systems   Constitutional:  Positive for chills, fever and malaise/fatigue.   Musculoskeletal:  Positive for joint pain and myalgias.        PMH:   No pertinent past medical history to this problem  MEDS:  Medications were reviewed in EMR  ALLERGIES:  Allergies were reviewed in EMR  FH:   No pertinent family history to this problem      Objective     /70   Pulse 66   Temp 36.7 °C (98.1 °F) (Temporal)   Resp 18   Ht 1.651 m (5' 5\")   Wt 88.7 kg (195 lb 9.6 oz)   SpO2 98%   BMI 32.55 kg/m²      Physical Exam    Constitutional: Pt is oriented to person, place, and time.  Appears well-developed and well-nourished. No distress.   Eyes: Conjunctivae are " normal.   Cardiovascular: Normal rate.    Pulmonary/Chest: Effort normal.   Musculoskeletal: Left thumb: Diffuse edema and erythema predominantly over the dorsal aspect of the left thumb overlying the IP joint.  Small amounts of purulent discharge draining from the puncture wound in clinic.  Patient has diffuse tenderness over the thumb.  Tenderness along the flexor tendon sheath.  Range of motion severely limited due to pain and swelling.  Neurological: Pt is alert and oriented to person, place, and time. Coordination normal.   Skin: Skin is warm. Pt is not diaphoretic. No erythema.   Psychiatric: Pt has a normal mood and affect.  Behavior is normal.        RADIOLOGY RESULTS   DX-FINGER(S) 2+ LEFT    Result Date: 12/19/2022 12/19/2022 1:32 PM HISTORY/REASON FOR EXAM:  Pain in first digit of left hand after puncture wound to first digit of left hand. TECHNIQUE/EXAM DESCRIPTION AND NUMBER OF VIEWS:   3 views of the LEFT fingers. COMPARISON: None FINDINGS: Bone mineralization is normal.  There is no evidence of fracture or dislocation.  There is joint space narrowing and marginal spurring at the IP joint of the first digit.  Soft tissue swelling is noted.  No metallic foreign bodies are identified.     1.  No evidence of fracture or dislocation. 2.  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                Assessment & Plan        1. Puncture wound of left thumb, subsequent encounter  - Referral to Occupational Medicine    2. Cellulitis of left thumb  - Referral to Occupational Medicine      Patient symptoms have worsened over the last 24 hours.  He is experiencing worsening swelling, redness and purulent drainage.  Prior x-ray reviewed with no evidence of fracture or underlying foreign body.  Last visit he received 1 g Rocephin in clinic.  He was discharged with doxycycline and has been taking this as directed.  Demonstrates diffuse tenderness on exam over the thumb.  Tenderness along the flexor tendon  sheath.  Patient has failed to respond to outpatient antibiotics at this time.  We believe the patient needs to be worked up in the emergency department at this time.  May possibly need to have the area thoroughly flushed out and started on IV antibiotics as infection is overlying the joint and close to the flexor tendon.  Is also beginning to experience mild systemic signs of infection.  Patient verbalized understanding of this plan and will head to the emergency department directly after this visit.  Continue with no use of left hand while at work.  Referral placed to follow-up with occupational medicine on or before 12/28/2022.    Differential diagnosis, natural history, supportive care, and indications for immediate follow-up discussed at length.

## 2022-12-21 NOTE — LETTER
[address] [place] [address]  Phone:  [phone] - Fax:  [phone]   [facility] Progress Report and Disability Certification  Date of Service: 12/21/2022   No Show:  No  Date / Time of Next Visit: 12/28/2022 3PM   Claim Information   Patient Name: Javier Cruz  Claim Number:     Employer: [employer]  Date of Injury: 12/16/2022     Insurer / TPA: M[employer] Workers Comp  ID / SSN:     Occupation:   Diagnosis: Diagnoses of Puncture wound of left thumb, subsequent encounter and Cellulitis of left thumb were pertinent to this visit.    Medical Information   Related to Industrial Injury? Yes    Subjective Complaints:  HPI: From 12/19/22  DOI: 12/16/2022   Initial Visit   ANITHA: Patient states while he was welding a piece of wire, the wire accidentally went through his welding glove and punctured his left thumb. Gradually worsening redness, swelling, and pain. Small amount of pus drainage. He cannot flex his thumb secondary to the pain and swelling. No fever or chills. He states his last tetanus vaccine was a long time ago. No prior contributing injuries. Denies a second job.     HPI: Today 12/21/22  Very pleasant 57-year-old male presented to the clinic for follow-up regarding a recent work-related injury.  Patient states his pain has worsened since last visit.  Swelling and redness have also increased.  States he has occasional purulent drainage from the puncture wound.  Range of motion is severely limited at this time due to pain and swelling.  Last visit he received 1 g of Rocephin in clinic and was discharged with a course of doxycycline.  He has been taking his antibiotics as prescribed.  States he is beginning to feel slightly feverish and chilled.   Objective Findings: Constitutional: Pt is oriented to person, place, and time.  Appears well-developed and well-nourished. No distress.   Eyes: Conjunctivae are normal.    Cardiovascular: Normal rate.    Pulmonary/Chest: Effort normal.   Musculoskeletal: Left thumb: Diffuse edema and erythema predominantly over the dorsal aspect of the left thumb overlying the IP joint.  Small amounts of purulent discharge draining from the puncture wound in clinic.  Patient has diffuse tenderness over the thumb.  Tenderness along the flexor tendon sheath.  Range of motion severely limited due to pain and swelling.  Neurological: Pt is alert and oriented to person, place, and time. Coordination normal.   Skin: Skin is warm. Pt is not diaphoretic. No erythema.   Psychiatric: Pt has a normal mood and affect.  Behavior is normal.      Pre-Existing Condition(s):     Assessment:   Condition Worsened    Status: Discharged / Care Transfer  Comments:Transfer to occupational medicine  Permanent Disability:No    Plan:      Diagnostics:      Comments:       Disability Information   Status: Released to Restricted Duty    From:  12/21/2022  Through: 12/28/2022 Restrictions are: Temporary   Physical Restrictions   Sitting:    Standing:    Stooping:    Bending:      Squatting:    Walking:    Climbing:    Pushing:      Pulling:    Other:    Reaching Above Shoulder (L):   Reaching Above Shoulder (R):       Reaching Below Shoulder (L):    Reaching Below Shoulder (R):      Not to exceed Weight Limits   Carrying(hrs):   Weight Limit(lb):   Lifting(hrs):   Weight  Limit(lb):     Comments: Patient symptoms have worsened over the last 24 hours.  He is experiencing worsening swelling, redness and purulent drainage.  Prior x-ray reviewed with no evidence of fracture or underlying foreign body.  Last visit he received 1 g Rocephin in clinic.  He was discharged with doxycycline and has been taking this as directed.  Demonstrates diffuse tenderness on exam over the thumb.  Tenderness along the flexor tendon sheath.  Patient has failed to respond to outpatient antibiotics at this time.  We believe the patient needs to be worked  up in the emergency department at this time.  May possibly need to have the area thoroughly flushed out and started on IV antibiotics as infection is overlying the joint and close to the flexor tendon.  Is also beginning to experience mild systemic signs of infection.  Patient verbalized understanding of this plan and will head to the emergency department directly after this visit.  Continue with no use of left hand while at work.  Referral placed to follow-up with occupational medicine on or before 12/28/2022.    Repetitive Actions   Hands: i.e. Fine Manipulations from Grasping:     Feet: i.e. Operating Foot Controls:     Driving / Operate Machinery:     Health Care Provider’s Original or Electronic Signature  Leif Power P.A.-C. Health Care Provider’s Original or Electronic Signature    Facundo Novoa DO MPH     Clinic Name / Location: [address] [address] Clinic Phone Number: Dept: [phone]   Appointment Time: 1:10 Pm Visit Start Time: 2:13 PM   Check-In Time:  1:11 Pm Visit Discharge Time: 2:55PM   Original-Treating Physician or Chiropractor    Page 2-Insurer/TPA    Page 3-Employer    Page 4-Employee

## 2022-12-23 LAB
BACTERIA WND AEROBE CULT: NORMAL
GRAM STN SPEC: NORMAL
SIGNIFICANT IND 70042: NORMAL
SITE SITE: NORMAL
SOURCE SOURCE: NORMAL

## 2023-01-05 ENCOUNTER — OCCUPATIONAL MEDICINE (OUTPATIENT)
Dept: URGENT CARE | Facility: PHYSICIAN GROUP | Age: 58
End: 2023-01-05
Payer: OTHER MISCELLANEOUS

## 2023-01-05 VITALS
DIASTOLIC BLOOD PRESSURE: 72 MMHG | BODY MASS INDEX: 32.65 KG/M2 | HEART RATE: 67 BPM | TEMPERATURE: 96.7 F | WEIGHT: 196 LBS | OXYGEN SATURATION: 97 % | RESPIRATION RATE: 14 BRPM | SYSTOLIC BLOOD PRESSURE: 136 MMHG | HEIGHT: 65 IN

## 2023-01-05 DIAGNOSIS — L03.012 CELLULITIS OF LEFT THUMB: ICD-10-CM

## 2023-01-05 DIAGNOSIS — S61.032D PUNCTURE WOUND OF LEFT THUMB, SUBSEQUENT ENCOUNTER: ICD-10-CM

## 2023-01-05 PROBLEM — M00.9 SEPTIC ARTHRITIS (HCC): Status: ACTIVE | Noted: 2022-12-21

## 2023-01-05 PROCEDURE — 99214 OFFICE O/P EST MOD 30 MIN: CPT | Performed by: NURSE PRACTITIONER

## 2023-01-05 RX ORDER — ACETAMINOPHEN 325 MG/1
TABLET ORAL
COMMUNITY
Start: 2022-12-23

## 2023-01-05 RX ORDER — TRAMADOL HYDROCHLORIDE 50 MG/1
TABLET ORAL
COMMUNITY
Start: 2022-12-23

## 2023-01-05 RX ORDER — AMOXICILLIN AND CLAVULANATE POTASSIUM 875; 125 MG/1; MG/1
1 TABLET, FILM COATED ORAL 2 TIMES DAILY
COMMUNITY
Start: 2022-12-23 | End: 2023-01-05

## 2023-01-05 NOTE — PROGRESS NOTES
"  Chief Complaint   Patient presents with    Follow-Up     WC Left thumb pain to the touch        HISTORY OF PRESENT ILLNESS: Patient is a pleasant 57 y.o. male who presents to urgent care today with a work comp follow up. DOI 12/16/22: Patient developed a puncture wound to left thumb while at work.  He then developed cellulitis to the finger.  He was initially treated with doxycyline and rocephin without improvement.  He was then hospitalized for IV abx for three nights due to worsening.  He was discharged home with 10 days of Augmentin, he completed the antibiotics yesterday.  Today he reports improvement.  Mild pain with touch over IP joint.  Swelling still present, mild. Denies fever, chills or other concerns.  He has not been at work since the 19th.  Patient is Maltese-speaking, a  is used for the entire visit.      PMH: No pertinent past medical history to this problem  MEDS: Medications were reviewed in Epic  ALLERGIES: Allergies were reviewed in Epic  FH: No pertinent family history to this problem      ROS:  Review of Systems   Constitutional: Negative for fever, chills, weight loss, malaise, and fatigue.   HENT: Negative for ear pain, nosebleeds, congestion, sore throat and neck pain.    Eyes: Negative for vision changes.   Neuro: Negative for headache, sensory changes, weakness, seizure, LOC.   Cardiovascular: Negative for chest pain, palpitations, orthopnea and leg swelling.   Respiratory: Negative for cough, sputum production, shortness of breath and wheezing.   Gastrointestinal: Negative for abdominal pain, nausea, vomiting or diarrhea.   Genitourinary: Negative for dysuria, urgency and frequency.  Musculoskeletal: Positive for thumb injury/infection. Negative for falls, neck pain, back pain, joint pain, myalgias.   Skin: Negative for rash, diaphoresis.     Exam:  /72   Pulse 67   Temp 35.9 °C (96.7 °F) (Temporal)   Resp 14   Ht 1.651 m (5' 5\")   Wt 88.9 kg (196 lb)   SpO2 " 97%   General: well-nourished, well-developed male in NAD  Head: normocephalic, atraumatic  Eyes: PERRLA, no conjunctival injection, acuity grossly intact, lids normal.  Ears: normal shape and symmetry, no tenderness, no discharge. External canals are without any significant edema or erythema. Tympanic membranes are without any inflammation, no effusion. Gross auditory acuity is intact.  Nose: symmetrical without tenderness, no discharge.  Mouth/Throat: reasonable hygiene, no erythema, exudates or tonsillar enlargement.  Neck: no masses, range of motion within normal limits, no tracheal deviation. No obvious thyroid enlargement.   Lymph: no cervical adenopathy. No supraclavicular adenopathy.   Neuro: alert and oriented. Cranial nerves 1-12 grossly intact. No sensory deficit.   Cardiovascular: regular rate and rhythm. No edema.  Pulmonary: no distress. Chest is symmetrical with respiration, no wheezes, crackles, or rhonchi.   Musculoskeletal: no clubbing, appropriate muscle tone, gait is stable.Left thumb: mild swelling, faint erythema, and minimal tenderness over IP joint.  No active drainage, no fluctuance, no streaking.  Thumb has good ROM, slightly limited secondary to swelling.  Sensation intact distally.  Cap refill less than 2 seconds.   Skin: warm, dry, intact, no clubbing, no cyanosis, no rashes.   Psych: appropriate mood, affect, judgement.         Assessment/Plan:  1. Puncture wound of left thumb, subsequent encounter        2. Cellulitis of left thumb              Patient presents for follow-up of puncture wound and secondary cellulitis left thumb.  Patient completed outpatient antibiotic, Augmentin, yesterday.  Reports significant improvement today.  Do not see need for additional antibiotic therapy at this time, no loss.  Reevaluate in 2 days.  OTC motrin or tylenol for pain, work restrictions.  Supportive care, differential diagnoses, and indications for immediate follow-up discussed with patient.    Pathogenesis of diagnosis discussed including typical length and natural progression.   Instructed to return to clinic or nearest emergency department sooner for any change in condition, further concerns, or worsening of symptoms.  Patient states understanding of the plan of care and discharge instructions.          Please note that this dictation was created using voice recognition software. I have made every reasonable attempt to correct obvious errors, but I expect that there are errors of grammar and possibly content that I did not discover before finalizing the note. Previous clinic visit encounter reviewed and considered in medical decision making today. I spent a total of 35 minutes with record review, exam, communication with the patient, and documentation of this encounter.          GISELLA Parks.

## 2023-01-05 NOTE — LETTER
[facility] [place] [place] [address]  Phone:  [phone] - Fax:  [phone]   [facility] Progress Report and Disability Certification  Date of Service: 1/5/2023   No Show:  No  Date / Time of Next Visit: 1/7/2023   Claim Information   Patient Name: Javier Cruz  Claim Number:     Employer: [employer]  Date of Injury: 12/16/2022     Insurer / TPA: M[employer] Workers [employer]  ID / SSN:     Occupation:   Diagnosis: Diagnoses of Puncture wound of left thumb, subsequent encounter and Cellulitis of left thumb were pertinent to this visit.    Medical Information   Related to Industrial Injury? Yes    Subjective Complaints:  DOI 12/16/22: Patient developed a puncture wound to left thumb while at work.  He then developed cellulitis to the finger.  He was initially treated with doxycyline and rocephin without improvement.  He was then hospitalized for IV abx for three nights due to worsening.  He was discharged home with 10 days of Augmentin, he completed the antibiotics yesterday.  Today he reports improvement.  Mild pain with touch over IP joint.  Swelling still present, mild. Denies fever, chills or other concerns.  He has not been at work since the 19th.    Objective Findings: A/O x4. NAD. Left thumb: mild swelling, faint erythema, and minimal tenderness over IP joint.  No active drainage, no fluctuance, no streaking.  Thumb has good ROM, slightly limited secondary to swelling.  Sensation intact distally.  Cap refill less than 2 seconds.   Pre-Existing Condition(s): Denies    Assessment:   Initial Visit    Status: Additional Care Required  Permanent Disability:No    Plan: Medication  Comments:OTC motrin or tylenol for pain, work restrictions, RTC in 2 days for re-eval.    Diagnostics:   Comments:N/A    Comments:       Disability Information   Status: Released to Restricted Duty    From:  1/5/2023  Through: 1/7/2023 Restrictions are: Temporary    Physical Restrictions   Sitting:    Standing:    Stooping:    Bending:      Squatting:    Walking:    Climbing:    Pushing:  < or = to 1 hr/day   Pulling:  < or = to 1 hr/day Other:    Reaching Above Shoulder (L):   Reaching Above Shoulder (R):       Reaching Below Shoulder (L):    Reaching Below Shoulder (R):      Not to exceed Weight Limits   Carrying(hrs):   Weight Limit(lb): < or = to 10 pounds Lifting(hrs):   Weight  Limit(lb): < or = to 10 pounds   Comments:      Repetitive Actions   Hands: i.e. Fine Manipulations from Grasping: < or = to 1 hr/day   Feet: i.e. Operating Foot Controls:     Driving / Operate Machinery:     Health Care Provider’s Original or Electronic Signature  OSMEL Parks Health Care Provider’s Original or Electronic Signature    Facundo Novoa DO MPH     Clinic Name / Location: [address] [address] Clinic Phone Number: Dept: [phone]   Appointment Time: 12:45 Pm Visit Start Time: 1:05 PM   Check-In Time:  12:45 Pm Visit Discharge Time:  1:35pm   Original-Treating Physician or Chiropractor    Page 2-Insurer/TPA    Page 3-Employer    Page 4-Employee

## 2023-01-07 ENCOUNTER — OCCUPATIONAL MEDICINE (OUTPATIENT)
Dept: URGENT CARE | Facility: PHYSICIAN GROUP | Age: 58
End: 2023-01-07
Payer: OTHER MISCELLANEOUS

## 2023-01-07 VITALS
WEIGHT: 196 LBS | BODY MASS INDEX: 32.65 KG/M2 | TEMPERATURE: 98.7 F | OXYGEN SATURATION: 97 % | HEIGHT: 65 IN | DIASTOLIC BLOOD PRESSURE: 82 MMHG | SYSTOLIC BLOOD PRESSURE: 136 MMHG | RESPIRATION RATE: 16 BRPM | HEART RATE: 86 BPM

## 2023-01-07 DIAGNOSIS — S61.032D PUNCTURE WOUND OF LEFT THUMB, SUBSEQUENT ENCOUNTER: ICD-10-CM

## 2023-01-07 PROCEDURE — 99213 OFFICE O/P EST LOW 20 MIN: CPT | Performed by: FAMILY MEDICINE

## 2023-01-07 RX ORDER — AMOXICILLIN AND CLAVULANATE POTASSIUM 875; 125 MG/1; MG/1
1 TABLET, FILM COATED ORAL 2 TIMES DAILY
Qty: 14 TABLET | Refills: 0 | Status: SHIPPED | OUTPATIENT
Start: 2023-01-07 | End: 2023-01-14

## 2023-01-07 NOTE — PROGRESS NOTES
"Subjective:      Chief Complaint   Patient presents with    Work-Related Injury     [facility]          HPI        services were used in the patient's primary language of C[place].     Name or Number: language line  Mode of interpretation: iPad    Content of Interpretation:  HPI      DOI:   12/16      Status post puncture wound to rt thumb with subsequent cellulitis and finished augmentin on 1/4         States pain is still present and still has some redness and swelling, but no fever.         Denies fevers, chills, redness, swelling.         Social History     Tobacco Use    Smoking status: Never   Vaping Use    Vaping Use: Never used   Substance Use Topics    Alcohol use: No    Drug use: No             Current Outpatient Medications on File Prior to Visit   Medication Sig Dispense Refill    acetaminophen (TYLENOL) 325 MG Tab TAKE 2 TABLETS BY MOUTH EVERY 6 HOURS AS NEEDED FOR MILD PAIN (1-3)      traMADol (ULTRAM) 50 MG Tab TAKE 1 TABLET BY MOUTH EVERY 8 HOURS AS NEEDED FOR MODERATE PAIN FOR UP TO 5 DAYS       No current facility-administered medications on file prior to visit.         No past medical history on file.            Review of Systems   Constitutional: Negative for fever.   Respiratory: Negative for cough.    Cardiovascular: Negative for chest pain.   All other systems reviewed and are negative.         Objective:     /82   Pulse 86   Temp 37.1 °C (98.7 °F) (Temporal)   Resp 16   Ht 1.651 m (5' 5\")   Wt 88.9 kg (196 lb)   SpO2 97%     Physical Exam   Constitutional: pt is oriented to person, place, and time. Pt appears well-developed and well-nourished. No distress.   HENT:   Head: Normocephalic and atraumatic.   Eyes: Conjunctivae are normal.   Cardiovascular: Normal rate.  RRR.  No murmer   Pulmonary/Chest: Effort normal.  CTAB  Musculoskeletal:        Right hand:   Rt thumb:   full AROM.    + mild erythema, inc warmth,  TTP.    Cap refill < 2 s       Normal sensation " noted. Normal strength noted.   Neurological: pt is alert and oriented to person, place, and time.   Skin: Skin is warm. Pt is not diaphoretic. No erythema.   Nursing note and vitals reviewed.              Assessment/Plan:           1. Puncture wound of left thumb, subsequent encounter  Thumb appears still infected      - amoxicillin-clavulanate (AUGMENTIN) 875-125 MG Tab; Take 1 Tablet by mouth 2 times a day for 7 days.  Dispense: 14 Tablet; Refill: 0    Restrictions per D39      F/u one wk

## 2023-01-07 NOTE — LETTER
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    Medical Information   Related to Industrial Injury? Yes    Subjective Complaints:     HPI      DOI:   12/16      Status post puncture wound to rt thumb with subsequent cellulitis and finished augmentin on 1/4         States pain is still present and still has some redness and swelling, but no fever.         Denies fevers, chills, redness, swelling.      Objective Findings:        Right hand:   Rt thumb:   full AROM.    + mild erythema, inc warmth,  TTP.    Cap refill < 2 s     Pre-Existing Condition(s):     Assessment:   Condition Improved    Status: Additional Care Required  Permanent Disability:No    Plan:      Diagnostics:      Comments:       Disability Information   Status: Released to Full Duty    From:     Through:   Restrictions are:     Physical Restrictions   Sitting:    Standing:    Stooping:    Bending:      Squatting:    Walking:    Climbing:    Pushing:      Pulling:    Other:    Reaching Above Shoulder (L):   Reaching Above Shoulder (R):       Reaching Below Shoulder (L):    Reaching Below Shoulder (R):      Not to exceed Weight Limits   Carrying(hrs):   Weight Limit(lb): < or = to 10 pounds Lifting(hrs):   Weight  Limit(lb): < or = to 10 pounds  Comments:rt   Comments: Puncture wound of left thumb, subsequent encounter  Thumb appears still infected      - amoxicillin-clavulanate (AUGMENTIN) 875-125 MG Tab; Take 1 Tablet by mouth 2 times a  day for 7 days.  Dispense: 14 Tablet; Refill: 0    Restrictions per D39      F/u one wk    Repetitive Actions   Hands: i.e. Fine Manipulations from Grasping: < or = to 1 hr/day  Comments:rt   Feet: i.e. Operating Foot Controls:     Driving / Operate Machinery:     Health Care Provider’s Original or Electronic Signature  Vito Donahue M.D. Health Care Provider’s Original or Electronic Signature    Facundo Novoa DO MPH     Clinic Name / Location: [address] [address] Clinic Phone Number: Dept: [phone]   Appointment Time: 1:00 Pm Visit Start Time: 2:55 PM   Check-In Time:  12:11 Pm Visit Discharge Time:  3:14 PM   Original-Treating Physician or Chiropractor    Page 2-Insurer/TPA    Page 3-Employer    Page 4-Employee

## 2023-01-13 ENCOUNTER — OCCUPATIONAL MEDICINE (OUTPATIENT)
Dept: URGENT CARE | Facility: PHYSICIAN GROUP | Age: 58
End: 2023-01-13
Payer: COMMERCIAL

## 2023-01-13 VITALS
RESPIRATION RATE: 16 BRPM | SYSTOLIC BLOOD PRESSURE: 112 MMHG | WEIGHT: 196 LBS | OXYGEN SATURATION: 97 % | HEART RATE: 78 BPM | HEIGHT: 65 IN | TEMPERATURE: 97.3 F | BODY MASS INDEX: 32.65 KG/M2 | DIASTOLIC BLOOD PRESSURE: 66 MMHG

## 2023-01-13 DIAGNOSIS — S61.032D PUNCTURE WOUND OF LEFT THUMB, SUBSEQUENT ENCOUNTER: ICD-10-CM

## 2023-01-13 PROCEDURE — 99215 OFFICE O/P EST HI 40 MIN: CPT

## 2023-01-13 NOTE — LETTER
[address] [place] [address]  Phone:  [phone] - Fax:  [phone]   [facility] Progress Report and Disability Certification  Date of Service: 1/13/2023   No Show:  No  Date / Time of Next Visit:     Claim Information   Patient Name: Javier Cruz  Claim Number:     Employer: [employer]  Date of Injury: 12/16/2022     Insurer / TPA: [employer]  ID / SSN:     Occupation:   Diagnosis: The encounter diagnosis was Puncture wound of left thumb, subsequent encounter.    Medical Information   Related to Industrial Injury?      Subjective Complaints:     Third visit 1\13\23: This is a Slovenian-speaking male who presents today for evaluation of work-related injury to his left thumb.   service used during the entire encounter.  Patient reports worsening pain to left thumb.  He reports that he has been working and not tolerating work well.  He reports completing course of oral antibiotics that was prescribed during his last visit today.  He reports increased swelling to his finger.  He reports pain is 6\10.  He reports decreased sensation to his left thumb.  He reports subjective fevers and chills over the last 3 days.        ROS per HPI      Objective Findings: Physical Exam  Constitutional:       General: He is not in acute distress.     Appearance: Normal appearance. He is normal weight. He is not ill-appearing, toxic-appearing or diaphoretic.   HENT:      Head: Normocephalic and atraumatic.   Musculoskeletal:        Hands:  Neurological:      Mental Status: He is alert.    Pre-Existing Condition(s):     Assessment:   Condition Worsened    Status:    Permanent Disability:     Plan:   Comments:Sending patient to ED    Diagnostics:      Comments:       Disability Information   Status:      From:     Through:   Restrictions are:     Physical Restrictions   Sitting:    Standing:    Stooping:    Bending:       Squatting:    Walking:    Climbing:    Pushing:      Pulling:    Other:    Reaching Above Shoulder (L):   Reaching Above Shoulder (R):       Reaching Below Shoulder (L):    Reaching Below Shoulder (R):      Not to exceed Weight Limits   Carrying(hrs):   Weight Limit(lb):   Lifting(hrs):   Weight  Limit(lb):     Comments:      Repetitive Actions   Hands: i.e. Fine Manipulations from Grasping:     Feet: i.e. Operating Foot Controls:     Driving / Operate Machinery:     Health Care Provider’s Original or Electronic Signature  [facility] Care Provider’s Original or Electronic Signature    Facundo Novoa DO MPH     Clinic Name / Location: [address] [address] Clinic Phone Number: Dept: [phone]   Appointment Time: 1:00 Pm Visit Start Time: 1:53 PM   Check-In Time:  1:05 Pm Visit Discharge Time: 2:45 Pm    Original-Treating Physician or Chiropractor    Page 2-Insurer/TPA    Page 3-Employer    Page 4-Employee

## 2023-01-13 NOTE — PROGRESS NOTES
Subjective:   Javier Cruz is a 57 y.o. male who presents for Work-Related Injury (Wc fv )      HPI:  First visit 1\5\23: Patient is a pleasant 57 y.o. male who presents to urgent care today with a work comp follow up. DOI 12/16/22: Patient developed a puncture wound to left thumb while at work.  He then developed cellulitis to the finger.  He was initially treated with doxycyline and rocephin without improvement.  He was then hospitalized for IV abx for three nights due to worsening.  He was discharged home with 10 days of Augmentin, he completed the antibiotics yesterday.  Today he reports improvement.  Mild pain with touch over IP joint.  Swelling still present, mild. Denies fever, chills or other concerns.  He has not been at work since the 19th.  Patient is Hungarian-speaking, a  is used for the entire visit.    Second visit 1\7\23:Content of Interpretation:  HPI  DOI:   12/16  Status post puncture wound to rt thumb with subsequent cellulitis and finished augmentin on 1/4  States pain is still present and still has some redness and swelling, but no fever.   Denies fevers, chills, redness, swelling.     Third visit 1\13\23: This is a Hungarian-speaking male who presents today for evaluation of work-related injury to his left thumb.   service used during the entire encounter.  Patient reports worsening pain to left thumb.  He reports that he has been working and not tolerating work well.  He reports completing course of oral antibiotics that was prescribed during his last visit today.  He reports increased swelling to his finger.  He reports pain is 6\10.  He reports decreased sensation to his left thumb.  He reports subjective fevers and chills over the last 3 days.      ROS per HPI        Problem list, medications, and allergies reviewed by myself today in Epic, not pertinent to today's visit.     Objective:     /66   Pulse 78   Temp 36.3 °C (97.3 °F) (Temporal)   Resp  "16   Ht 1.651 m (5' 5\")   Wt 88.9 kg (196 lb)   SpO2 97%   BMI 32.62 kg/m²     Physical Exam  Constitutional:       General: He is not in acute distress.     Appearance: Normal appearance. He is normal weight. He is not ill-appearing, toxic-appearing or diaphoretic.   HENT:      Head: Normocephalic and atraumatic.   Musculoskeletal:        Hands:    Neurological:      Mental Status: He is alert.        Assessment/Plan:     Diagnosis and associated orders:   1. Puncture wound of left thumb, subsequent encounter               Comments/MDM:   Acute problem.  Patient presents today for 3 evaluation of puncture wound to left thumb.  Patient reports worsening in his condition despite completion of oral antibiotics.  He also reports fevers and chills over the last 3 nights.  On physical exam patient noted to have moderate amount of swelling and increased warmth to his left thumb.  I am concerned about possible septic joint.  At this time, I am recommending patient to go to emergency room for further evaluation and treatment.  Patient reports that he will go to [facility].        .       Please note that this dictation was created using voice recognition software. I have made a reasonable attempt to correct obvious errors, but I expect that there are errors of grammar and possibly content that I did not discover before finalizing the note.    This note was electronically signed by ALEXUS Emmanuel    "